# Patient Record
Sex: MALE | Race: WHITE
[De-identification: names, ages, dates, MRNs, and addresses within clinical notes are randomized per-mention and may not be internally consistent; named-entity substitution may affect disease eponyms.]

---

## 2018-12-04 ENCOUNTER — HOSPITAL ENCOUNTER (EMERGENCY)
Dept: HOSPITAL 88 - ER | Age: 83
LOS: 1 days | Discharge: HOME | End: 2018-12-05
Payer: MEDICARE

## 2018-12-04 VITALS — HEIGHT: 78 IN | BODY MASS INDEX: 22.79 KG/M2 | WEIGHT: 197 LBS

## 2018-12-04 DIAGNOSIS — I10: Primary | ICD-10-CM

## 2018-12-04 DIAGNOSIS — Z79.4: ICD-10-CM

## 2018-12-04 DIAGNOSIS — E11.9: ICD-10-CM

## 2018-12-04 PROCEDURE — 93005 ELECTROCARDIOGRAM TRACING: CPT

## 2018-12-04 PROCEDURE — 36415 COLL VENOUS BLD VENIPUNCTURE: CPT

## 2018-12-04 PROCEDURE — 85025 COMPLETE CBC W/AUTO DIFF WBC: CPT

## 2018-12-04 PROCEDURE — 82550 ASSAY OF CK (CPK): CPT

## 2018-12-04 PROCEDURE — 99283 EMERGENCY DEPT VISIT LOW MDM: CPT

## 2018-12-04 PROCEDURE — 82553 CREATINE MB FRACTION: CPT

## 2018-12-04 PROCEDURE — 84484 ASSAY OF TROPONIN QUANT: CPT

## 2018-12-04 PROCEDURE — 80053 COMPREHEN METABOLIC PANEL: CPT

## 2018-12-04 NOTE — XMS REPORT
Clinical Summary

                             Created on: 2018



Archie Nick

External Reference #: [id number]

: 1935

Sex: Male



Demographics







                          [place]                   [facility] [place]  [address]

 

                          Home Phone                [phone]

 

                          Preferred Language        English

 

                          Marital Status            Unknown

 

                          Baptism Affiliation     Taoism

 

                          Race                      White

 

                          Ethnic Group              Non-





Author







                          Author                    [facility]

 

                          Address                   Unknown

 

                          Phone                     Unavailable







Support







                Name            Relationship    Address         Phone

 

                Oskar Nick     EC[employer]            Unknown         [phone]







Care Team Providers







                    Care Team Member Name    Role                Phone

 

                    Sharpless           PCP                 [phone]







Allergies

No Known Allergies



Medications







                          End Date                  Status



              Medication     Sig          Dispensed     Refills      Start  



                                         Date  

 

                                                    Active



                     cloNIDine HCl (CATAPRES)     Take 0.1 mg         0   



                           0.1 MG tablet             by mouth 2     



                                         (two) times     



                                         daily.     

 

                                                    Active



                     SITagliptin (JANUVIA) 25     Take 25 mg by       0   



                           MG tablet                 mouth daily.     

 

                                                    Active



                     amLODIPine (NORVASC) 10     Take 10 mg by       0   



                           MG tablet                 mouth daily.     

 

                                                    Active



                     tamsulosin (FLOMAX) 0.4     Take 0.4 mg         0   



                           mg Cp24 24 hr capsule     by mouth     



                                         daily.     

 

                                                    Active



                     losartan-hydroCHLOROthiaz     Take 1 tablet       0   



                           sandra (HYZAAR) 50-12.5 mg     by mouth     



                           per tablet                daily.     

 

                                                    Active



                     insulin 70/30, insulin     Inject 10           0   



                           NPH-insulin regular,      Units     



                           (NOVOLIN 70/30) 100       subcutaneousl     



                           unit/mL (70-30) injection     y 2 (two)     



                                         times daily     



                                         before meals.     

 

                                                    Active



              tamsulosin (FLOMAX) 0.4     Take 1       30 capsule     0            08/201  



                     mg Cp24 24 hr capsule     capsule (0.4        8  



                                         mg total) by     



                                         mouth daily.     

 

                          2019                Active



              finasteride (PROSCAR) 5     Take 1 tablet     30 tablet     0            /201  



                     mg tablet           (5 mg total)        8  



                                         by mouth     



                                         daily.     

 

                          2018                



              docusate sodium (COLACE)     Take 1       10 capsule     0            201  



                     100 MG capsule      capsule (100        8  



                                         mg total) by     



                                         mouth 2 (two)     



                                         times daily     



                                         for 10 days.     

 

                          2018                



              traMADol (ULTRAM) 50 mg     Take 2       30 tablet     0            201  



                     tablet              tablets (100        8  



                                         mg total) by     



                                         mouth every 6     



                                         (six) hours     



                                         as needed for     



                                         up to 10     



                                         days. Max     



                                         Daily Amount:     



                                         400 mg     







Active Problems







 



                           Problem                   Noted Date

 

 



                           Renal mass                2018







Encounters







                          Care Team                 Description



                     Date                Type                Specialty  

 

                                        



Peggy Silva MD                 



                           2018                Anesthesia   



                                         Event   

 

                                        



Link, Mariusz Mccray MD                ROBOTIC LAPAROSCOPY,NEPHRECTOMY



                           2018                Surgery   

 

                                        



Link, Mariusz Mccray MD                 



                     2018          [facility]            General Internal Medicine  



                           -                         Encounter   



                                         2018    

 

                                        



LinkMariusz MD                 



                     2018          Hospital            Pre-Admission Testing  



                                         Encounter   

 

                                        



Jordyn Rocha BHAVYA                         



                     2018          Orders Only         Pre-Admission Testing  



after 2017



Social History







                                        Date



                 Tobacco Use     Types           Packs/Day       Years Used 

 

                                        Quit: 



                     Former Smoker       1.5                 13 

 

    



                                         Smokeless Tobacco: Never   



                                         Used   









   



                 Alcohol Use     Drinks/Week     oz/Week         Comments

 

   



                                         No   









 



                           Sex Assigned at Birth     Date Recorded

 

 



                                         Not on file 









                                        Industry



                           Job Start Date            Occupation 

 

                                        Not on file



                           Not on file               Not on file 









                                        Travel End



                           Travel History            Travel Start 

 





                                         No recent travel history available.







Last Filed Vital Signs







                                        Time Taken



                           Vital Sign                Reading 

 

                                        2018  3:37 PM CDT



                           Blood Pressure            172/78 

 

                                        2018  3:37 PM CDT



                           Pulse                     91 

 

                                        2018  3:37 PM CDT



                           Temperature               36.7 C (98 F) 

 

                                        2018  3:37 PM CDT



                           Respiratory Rate          18 

 

                                        2018  3:37 PM CDT



                           Oxygen Saturation         92% 

 

                                        -



                           Inhaled Oxygen            - 



                                         Concentration  

 

                                        2018  6:14 AM CDT



                           Weight                    89.4 kg (197 lb 1.6 oz) 

 

                                        2018  6:14 AM CDT



                           Height                    175.3 cm (5' 9.02") 

 

                                        2018  6:14 AM CDT



                           Body Mass Index           29.09 







Plan of Treatment





Not on file



Procedures







                                        Comments



                 Procedure Name     Priority        Date/Time       Associated Diagnosis 

 

                                         



                           INTRAOPERATIVE PATH       2018  



                           REPORT - SCAN             3:30 PM CDT  

 

                                        



Results for this procedure are in the results section.



                     POCT-GLUCOSE METER     Routine             2018  



                                         2:19 PM CDT  

 

                                        



Results for this procedure are in the results section.



                     POCT-GLUCOSE METER     Routine             2018  



                                         9:54 AM CDT  

 

                                        



Results for this procedure are in the results section.



                     XR ABDOMEN 1 VIEW     STAT                2018  



                                         8:03 AM CDT  

 

                                        



Results for this procedure are in the results section.



                     CBC W/PLT COUNT & AUTO     Routine             2018  



                           DIFFERENTIAL              4:57 AM CDT  

 

                                        



Results for this procedure are in the results section.



                     CBC W/PLT COUNT & AUTO     Routine             2018  



                           DIFFERENTIAL              4:57 AM CDT  

 

                                        



Results for this procedure are in the results section.



                     BASIC METABOLIC PANEL (7)     Routine             2018  



                                         4:57 AM CDT  

 

                                        



Results for this procedure are in the results section.



                     POCT-GLUCOSE METER     Routine             2018  



                                         9:27 PM CDT  

 

                                        



Results for this procedure are in the results section.



                     POCT-GLUCOSE METER     Routine             2018  



                                         5:46 PM CDT  

 

                                        



Results for this procedure are in the results section.



                     POCT-GLUCOSE METER     Routine             2018  



                                         11:45 AM CDT  

 

                                        



Results for this procedure are in the results section.



                     POCT-GLUCOSE METER     Routine             2018  



                                         7:05 AM CDT  

 

                                        



Results for this procedure are in the results section.



                     XR CHEST 1 VIEW     STAT                2018  



                           PORTABLE/BEDSIDE          5:27 AM CDT  

 

                                        



Results for this procedure are in the results section.



                     CBC W/PLT COUNT & AUTO     Routine             2018  



                           DIFFERENTIAL              4:35 AM CDT  

 

                                        



Results for this procedure are in the results section.



                     CBC W/PLT COUNT & AUTO     Routine             2018  



                           DIFFERENTIAL              4:35 AM CDT  

 

                                        



Results for this procedure are in the results section.



                     BASIC METABOLIC PANEL (7)     Routine             2018  



                                         4:35 AM CDT  

 

                                        



Results for this procedure are in the results section.



                     POCT-GLUCOSE METER     Routine             2018  



                                         9:01 PM CDT  

 

                                        



Results for this procedure are in the results section.



                     POCT-GLUCOSE METER     Routine             2018  



                                         3:25 PM CDT  

 

                                        



Results for this procedure are in the results section.



                     POCT-GLUCOSE METER     Routine             2018  



                                         11:45 AM CDT  

 

                                        



Results for this procedure are in the results section.



                     POCT-GLUCOSE METER     Routine             2018  



                                         7:36 AM CDT  

 

                                        



Results for this procedure are in the results section.



                     CBC W/PLT COUNT & AUTO     Routine             2018  



                           DIFFERENTIAL              3:07 AM CDT  

 

                                        



Results for this procedure are in the results section.



                     CBC W/PLT COUNT & AUTO     Routine             2018  



                           DIFFERENTIAL              3:07 AM CDT  

 

                                        



Results for this procedure are in the results section.



                     BASIC METABOLIC PANEL (7)     Routine             2018  



                                         3:07 AM CDT  

 

                                        



Results for this procedure are in the results section.



                     POCT-GLUCOSE METER     Routine             2018  



                                         9:08 PM CDT  

 

                                        



Results for this procedure are in the results section.



                     POCT-GLUCOSE METER     Routine             2018  



                                         5:20 PM CDT  

 

                                        



Results for this procedure are in the results section.



                     BASIC METABOLIC PANEL (7)     Routine             2018  



                                         3:27 PM CDT  

 

                                        



Results for this procedure are in the results section.



                     POCT-GLUCOSE METER     Routine             2018  



                                         11:52 AM CDT  

 

                                        



Results for this procedure are in the results section.



                     POCT-GLUCOSE METER     Routine             2018  



                                         8:10 AM CDT  

 

                                        



Results for this procedure are in the results section.



                     CBC W/PLT COUNT & AUTO     Routine             2018  



                           DIFFERENTIAL              5:04 AM CDT  

 

                                        



Results for this procedure are in the results section.



                     CBC W/PLT COUNT & AUTO     Routine             2018  



                           DIFFERENTIAL              5:04 AM CDT  

 

                                        



Results for this procedure are in the results section.



                     BASIC METABOLIC PANEL (7)     Routine             2018  



                                         5:04 AM CDT  

 

                                        



Results for this procedure are in the results section.



                     POCT-GLUCOSE METER     Routine             2018  



                                         8:54 PM CDT  

 

                                        



Results for this procedure are in the results section.



                     POCT-GLUCOSE METER     Routine             2018  



                                         5:04 PM CDT  

 

                                        



Results for this procedure are in the results section.



                     HEMOGLOBIN AND HEMATOCRIT     Routine             2018  



                                         12:03 PM CDT  

 

                                        



Results for this procedure are in the results section.



                     BASIC METABOLIC PANEL (7)     Routine             2018  



                                         12:03 PM CDT  

 

                                        



Results for this procedure are in the results section.



                     POCT-GLUCOSE METER     Routine             2018  



                                         12:01 PM CDT  

 

                                        



Results for this procedure are in the results section.



                     GLUCOSE-STAT LAB     STAT                2018  



                                         10:53 AM CDT  

 

                                        



Results for this procedure are in the results section.



                     TISSUE EXAM         AP Routine          2018  



                                         10:37 AM CDT  

 

                                        



Results for this procedure are in the results section.



                     GLUCOSE-STAT LAB     STAT                2018  



                                         9:36 AM CDT  

 

                                        



Results for this procedure are in the results section.



                     BLOOD GAS, ARTERIAL     STAT                2018  



                                         8:26 AM CDT  

 

                                        



Results for this procedure are in the results section.



                     CALCIUM, IONIZED     STAT                2018  



                                         8:26 AM CDT  

 

                                        



Results for this procedure are in the results section.



                     HGB/HCT (H&H) - STAT LAB     STAT                2018  



                                         8:26 AM CDT  

 

                                        



Results for this procedure are in the results section.



                     GLUCOSE-STAT LAB     STAT                2018  



                                         8:26 AM CDT  

 

                                        



Results for this procedure are in the results section.



                     POTASSIUM-STAT LAB     STAT                2018  



                                         8:26 AM CDT  

 

                                        



Results for this procedure are in the results section.



                     SODIUM NA-STAT LAB     STAT                2018  



                                         8:26 AM CDT  

 

                                         



                     PROCEDURE W/ DAVINCI      2018          Right renal mass 



                                         7:30 AM CDT  

 

  



                                         Special



                                         Needs



                                         (DAVINCI)

 

                                         



                     ROBOTIC             2018          Right renal mass 



                           LAPAROSCOPY,NEPHRECTOMY      7:30 AM CDT  

 

  



                                         Special



                                         Needs



                                         (DAVINCI)

 

                                        



Results for this procedure are in the results section.



                     POCT-GLUCOSE METER     Routine             2018  



                                         6:23 AM CDT  

 

                                         



                           TRANSFUSION SERVICE       2018  



                           REPORT - SCAN             5:44 PM CDT  

 

                                        



Results for this procedure are in the results section.



                     CBC W/PLT COUNT & AUTO     Routine             2018  



                           DIFFERENTIAL              11:37 AM CDT  

 

                                        



Results for this procedure are in the results section.



                     TYPE AND SCREEN,     Routine             2018  



                           AUTOMATED                 11:37 AM CDT  

 

                                        



Results for this procedure are in the results section.



                     APTT                Routine             2018  



                                         11:37 AM CDT  

 

                                        



Results for this procedure are in the results section.



                     PROTHROMBIN TIME/INR     Routine             2018  



                                         11:37 AM CDT  

 

                                        



Results for this procedure are in the results section.



                     COMPREHENSIVE METABOLIC     Routine             2018  



                           PANEL                     11:37 AM CDT  

 

                                        



Results for this procedure are in the results section.



                     CBC W/PLT COUNT & AUTO     Routine             2018  



                           DIFFERENTIAL              11:37 AM CDT  

 

                                        



Results for this procedure are in the results section.



                     URINALYSIS W/ MICROSCOPIC     Routine             2018  



                                         11:37 AM CDT  

 

                                        



Results for this procedure are in the results section.



                     URINE CULTURE       Routine             2018  



                                         11:37 AM CDT  



after 2017



Results

* INTRAOPERATIVE PATH REPORT - SCAN (2018  3:30 PM CDT)





 



                           Narrative                 Performed At

 

 



                                         This result has an attachment that is not available. 





* POC-Glucose meter (2018  2:19 PM CDT)



Only the most recent of 18 results within the time period is included.





   



                 Component       Value           Ref Range       Performed At

 

   



                 POC-Glucose Meter     148 (H)Comment: TESTED AT     70 - 110 mg/dL     [facility] [address] [facility]



                                         [id number]  













                                         Specimen

 





                                         Blood









   



                 Performing Organization     Address         City/State/Zipcode     Phone Number

 

   



                 [facility]     [address], [place] [id number] [phone]





                                         [facility]   





* XR abdomen / KUB 1 view (2018  8:03 AM CDT)





 



                           Narrative                 Performed At

 

 



                           FINAL REPORT              [facility]



                                         PATIENT ID: [id number] 



                                         Abdomen 3 views supine 2018 8:20 AM 



                                         CLINICAL INDICATION: abdominal distension 



                                         COMPARISON: None available 



                                         IMPRESSION: 



                                         There is a substantial amount of stool in the proximal and transverse 



                                         colon, with scattered loops of air-filled distal small bowel. 



                                         Findings may reflect fecal impaction. 



                                         The lung bases are well aerated. No abnormal calcifications are seen 



                                         in the region of the gallbladder or kidneys. 



                                         There are no acute-appearing skeletal abnormalities. 



                                         Signed: Seipel, Timothy MD 



                                         Report Verified Date/Time:2018 08:21:07 



                                         Reading Location: [facility] Radiology Reading Room 



                                         Electronically signed by: TIMOTHY J SEIPEL, M.D. on 2018 08:21 AM

 









                                        Procedure Note

 

                                        



Interface, External Ris In - 2018  8:27 AM CDT



FINAL REPORT

 

PATIENT ID:   [id number]

 

Abdomen 3 views supine 2018 8:20 AM

 

CLINICAL INDICATION: abdominal distension

 

COMPARISON: None available

 

IMPRESSION:

 

There is a substantial amount of stool in the proximal and transverse 

colon, with scattered loops of air-filled distal small bowel. 

Findings may reflect fecal impaction.

 

The lung bases are well aerated. No abnormal calcifications are seen 

in the region of the gallbladder or kidneys.

 

There are no acute-appearing skeletal abnormalities.

 

Signed: Seipel, Timothy MD

Report Verified Date/Time:  2018 08:21:07

 

Reading Location: [facility] Radiology Reading Room

    Electronically signed by: TIMOTHY J SEIPEL, M.D. on 2018 08:21 AM

 









   



                 Performing Organization     Address         City/State/Zipcode     Phone Number

 

   



                                         [facility]   





* CBC with platelet count + automated diff (2018  4:57 AM CDT)



Only the most recent of 5 results within the time period is included.





   



                 Component       Value           Ref Range       Performed At

 

   



                 WBC             10.9 (H)        3.5 - 10.5 K/L     [facility]

 

   



                 RBC             4.43 (L)        4.63 - 6.08 M/L     [facility]

 

   



                 Hemoglobin      13.4 (L)        13.7 - 17.5 GM/DL     [facility]

 

   



                 Hematocrit      40.5            40.1 - 51.0 %     [facility]

 

   



                 MCV             91.4            79.0 - 92.2 fL     [facility]

 

   



                 MCH             30.2            25.7 - 32.2 pg     [facility]

 

   



                 MCHC            33.1            32.3 - 36.5 GM/DL     [facility]

 

   



                 RDW             12.9            11.6 - 14.4 %     [facility]

 

   



                 Platelets       231             150 - 450 K/CU MM     [facility]

 

   



                 MPV             11.5            9.4 - 12.4 fL     [facility]

 

   



                 nRBC            0               0 - 0 /100 WBC     [facility]

 

   



                 % Neutros       79              %               [facility]

 

   



                 % Lymphs        9               %               [facility]

 

   



                 % Monos         9               %               [facility]

 

   



                 % Eos           2               %               [facility]

 

   



                 % Baso          0               %               [facility]

 

   



                 # Neutros       8.57 (H)        1.78 - 5.38 K/L     [facility]

 

   



                 # Lymphs        0.99 (L)        1.32 - 3.57 K/L     [facility]

 

   



                 # Monos         0.97 (H)        0.30 - 0.82 K/L     [facility]

 

   



                 # Eos           0.19            0.04 - 0.54 K/L     [facility]

 

   



                 # Baso          0.02            0.01 - 0.08 K/L     [facility]

 

   



                 Immature        1               0 - 1 %         [facility]



                           Granulocytes-Relative       [facility]













                                         Specimen

 





                                         Blood - Arm, Right









   



                 Performing Organization     Address         City/State/Zipcode     Phone Number

 

   



                 [facility]     [address] [place] [address] [phone]





                                         MEDICAL CENTER   





* Basic Metabolic Panel (2018  4:57 AM CDT)



Only the most recent of 6 results within the time period is included.





   



                 Component       Value           Ref Range       Performed At

 

   



                 Sodium          130 (L)         136 - 145 meq/L     [facility]

 

   



                 Potassium       4.9             3.5 - 5.1 meq/L     [facility]

 

   



                 Chloride        96 (L)          98 - 107 meq/L     [facility]

 

   



                 CO2             22              22 - 29 meq/L     [facility]

 

   



                 BUN             32 (H)          7 - 21 mg/dL     [facility]

 

   



                 Creatinine      1.46 (H)        0.57 - 1.25 mg/dL     [facility]

 

   



                 Glucose         146 (H)         70 - 105 mg/dL     [facility]

 

   



                 Calcium         8.6             8.4 - 10.2 mg/dL     [facility]

 

   



                 EGFR            46Comment: ESTIMATED GFR IS     mL/min/1.73 sq m     [facility]



                           NOT AS ACCURATE AS CREATININE      [facility]



                                         CLEARANCE IN PREDICTING  



                                         GLOMERULAR FILTRATION RATE.  



                                         ESTIMATED GFR IS NOT  



                                         APPLICABLE FOR DIALYSIS  



                                         PATIENTS.  













                                         Specimen

 





                                         Blood - Arm, Right









   



                 Performing Organization     Address         City/State/Zipcode     Phone Number

 

   



                 [facility]     [address] [place] [address]     [phone] [place] [address]   





* XR chest 1 view portable / bedside (2018  5:27 AM CDT)





 



                           Narrative                 Performed At

 

 



                           FINAL REPORT               [facility]



                                         PATIENT ID: [id number] 



                                         RAD, CHEST, 1 VIEW, NON DEPT 



                                         INDICATION: desaturation 



                                         COMPARISON: None 



                                         FINDINGS: Portable frontal view of the chest. 



                                         IMPRESSION: 



                                         Support Lines: None. 



                                         Lungs and pleura: Subsegmental atelectasis bilaterally. Developing 



                                         consolidation noted in the left base may reflect pneumonia. No 



                                         pneumothorax. 



                                         Heart and mediastinum: Unremarkable mediastinal contours. 



                                         Additional findings: None. 



                                         Signed: JR Todd, Kaitlin RAMIREZ 



                                         Report Verified Date/Time:2018 05:40:11 



                                         Reading Location: [facility] C013Y CT Body Reading Room 



                                         Electronically signed by: KAITLIN BROOKS on 2018 05:40 AM

 









                                        Procedure Note

 

                                        



Interface, External Ris In - 2018  5:42 AM CDT



FINAL REPORT

 

PATIENT ID:   [id number]

 

RAD, CHEST, 1 VIEW, NON DEPT

 

INDICATION: desaturation

 

COMPARISON: None

 

FINDINGS: Portable frontal view of the chest. 

 

 

IMPRESSION: 

 

Support Lines: None. 

Lungs and pleura: Subsegmental atelectasis bilaterally. Developing 

consolidation noted in the left base may reflect pneumonia. No 

pneumothorax.

Heart and mediastinum: Unremarkable mediastinal contours.

Additional findings: None.

 

Signed: JR Brooks Robert MD

Report Verified Date/Time:  2018 05:40:11

 

Reading Location: [facility] B1 C013Y CT Body Reading Room

    Electronically signed by: KAITLIN BROOKS on 2018 05:40 AM

 









   



                 Performing Organization     Address         City/[facility]     Phone Number

 

   



                                          [facility]   





* Hemoglobin and hematocrit (2018 12:03 PM CDT)





   



                 Component       Value           Ref Range       Performed At

 

   



                 Hemoglobin      13.2 (L)        13.7 - 17.5 GM/DL     [facility]

 

   



                 Hematocrit      40.9            40.1 - 51.0 %     [facility]













                                         Specimen

 





                                         Blood









   



                 Performing Organization     Address         City/[facility]     Phone Number

 

   



                 [place] [phone] [place] [address]   





* Glucose-Stat Lab (2018 10:53 AM CDT)



Only the most recent of 3 results within the time period is included.





   



                 Component       Value           Ref Range       Performed At

 

   



                 Glucose         177 (H)         70 - 110 mg/dL     [facility]













                                         Specimen

 





                                         Blood, Arterial









   



                 Performing Organization     Address         City/State[facility]     Phone Number

 

   



                 [address] [address]     [phone]





                                         MEDICAL CENTER   





* Tissue Exam (2018 10:37 AM CDT)





   



                 Component       Value           Ref Range       Performed At

 

   



                     Case Report         Surgical Pathology      [facility]



                                          Case:  



                                         [id number]  



                                           



                                           



                                         Authorizing Provider:Mariusz Vasquez MD  



                                         Collected:  



                                         2018  



                                         1037  



                                         Ordering Location:  



                                         [facility]  



                                         PERIOPERATIVE  



                                         Received:  



                                         2018  



                                         1050  



                                           



                                           



                                         SERVICES  



                                           



                                           



                                           



                                           



                                         Pathologist:  



                                          Vinay Arguelles MD  



                                           



                                           



                                           



                                         Specimen:Mass, Right  



                                         Kidney Mass; Show and  



                                         Tell  



                                           



                                           



                                           

 

   



                     DIAGNOSIS           KIDNEY, RIGHT,      [facility]



                           ROBOTIC-ASSISTED          [facility]



                                         ADRENAL-SPARING LAPAROSCOPIC  



                                         RADICAL NEPHRECTOMY:  



                                         - CLEAR CELL RENAL CELL  



                                         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  



                                         GRADE 2-3 INVOLVING MIDPOLE  



                                         - UNIFOCAL, TUMOR SIZE: 4.8  



                                         x 3.9 x 2.6 CM  



                                         - TUMOR EXTENDS INTO RENAL  



                                         SINUS FAT  



                                         - NO DEFINITE LYMPHOVASCULAR  



                                         INVASION IS SEEN  



                                         - NO SARCOMATOID OR RHABDOID  



                                         FEATURES IDENTIFIED  



                                         - NECROSIS NOT IDENTIFIED  



                                         - RESECTION MARGINS  



                                         UNINVOLVED  



                                         - PATHOLOGIC STAGE  



                                         CLASSIFICATION (pTNM):  



                                         [id number]  



                                         - SEE SYNOPTIC REPORT  



                                         Signing Pathologist  



                                         Direct Phone Line:  



                                         [phone]  

 

   



                     COMMENT             IDC: Dr. Friedman agrees with      [facility]



                           perirenal sinus fat invasion.      [facility]

 

   



                     SYNOPTIC REPORT     KIDNEY: Nephrectomy(Kidney      [facility]



                           Res - All Specimens)      [facility]



                                         SPECIMEN  



                                           



                                         Procedure:Adrenal-spar  



                                         ing radical nephrectomy  



                                          Specimen  



                                         Laterality:Right  



                                         TUMOR  



                                          Tumor Site:Middle

  



                                          Histologic  



                                         Type:Clear cell renal  



                                         cell carcinoma  



                                          Histologic Grade (WHO /  



                                         ISUP Grade):G3:  



                                         Nucleoli conspicuous and  



                                         eosinophilic at 100x  



                                         magnification  



                                          Tumor  



                                         Size:Greatest  



                                         dimension in Centimeters (cm):  



                                         4.8 Centimeters (cm)  



                                          Additional Dimension  



                                         in Centimeters  



                                         (cm):3.9 Centimeters  



                                         (cm)  



                                          Additional Dimension  



                                         in Centimeters  



                                         (cm):2.6 Centimeters  



                                         (cm)  



                                          Tumor  



                                         Focality:Unifocal  



                                          Tumor  



                                         Extension:Tumor  



                                         extension into renal sinus  



                                          Sarcomatoid  



                                         Features:Not  



                                         identified  



                                          Rhabdoid  



                                         Features:Not  



                                         identified  



                                          Tumor  



                                         Necrosis:Not  



                                         identified  



                                          Lymphovascular  



                                         Invasion:Not  



                                         identified  



                                         MARGINS  



                                           



                                         Margins:Uninvolved by  



                                         invasive carcinoma  



                                         LYMPH NODES  



                                          Regional Lymph  



                                         Nodes:No lymph nodes  



                                         submitted or found  



                                         PATHOLOGIC STAGE  



                                         CLASSIFICATION (pTNM, AJCC 8th  



                                         Edition)  



                                          Primary Tumor  



                                         (pT):pT3a  



                                          Regional Lymph Nodes  



                                         (pN):pNX  



                                         ADDITIONAL FINDINGS  



                                          Pathologic Findings in  



                                         Nonneoplastic  



                                         Kidney:Glomerular  



                                         disease: focal global  



                                         glomerulosclerosis  



                                          Pathologic Findings in  



                                         Nonneoplastic  



                                         Kidney:Tubulointerstit  



                                         ial disease: chronic  



                                         tubulointerstitial  



                                         inflammation and atrophy  



                                          Pathologic Findings in  



                                         Nonneoplastic  



                                         Kidney:Vascular  



                                         disease: mild to moderate  



                                         arteriosclerosis  

 

   



                     CPT Code(s)         72826               [facility]

 

   



                     SPECIMEN SOURCE     Right kidney, description      [facility]



                           right kidney mass         [facility]

 

   



                     GROSS DESCRIPTION     The specimen is received in      [facility]



                           one part. Received fresh for      [facility]



                                         show and tell labeled with the  



                                         patient's information and  



                                         "mass" is a 27.5 x 15 x 9 cm  



                                         radical nephrectomy with an 8  



                                         cm in length ureter. The  



                                         kidney itself measures 10 x 6  



                                         x 5 cm. There is abundant  



                                         adipose tissue attached  



                                         measuring 12 x 11.5 x 5 cm.  



                                         The kidney is bivalved to show  



                                         a 4.8 x 3.9 x 2.6 cm,  



                                         encapsulated, lobulated,  



                                         case-yellow mass with focal  



                                         hemorrhage located at the mid  



                                         pole and pushing the ureter.  



                                         The tumor grossly seems to be  



                                         invading the renal sinus  



                                         tract. It does not invade the  



                                         ureter. Grossly the tumor does  



                                         not invade the renal capsule  



                                         or the perinephric fat. No  



                                         adrenal gland is identified.

  



                                         Section code: A1, vascular  



                                         and ureteral margins; A2-A4,  



                                         tumor involving renal sinus;  



                                         A5, tumor with ureter; A6, A7,  



                                         representative section of the  



                                         tumor; A8, A9, contiguous  



                                         section of tumor to the renal  



                                         cortex and capsule; A10, A11,  



                                         contiguous section, tumor  



                                         towards renal cortex and  



                                         capsule; A12, tumor with  



                                         adjacent parenchyma; A13, A14,  



                                         representative section of the  



                                         perinephric fat with Gerota's  



                                         fascia inked black. AG/ew  

 

   



                     MICROSCOPIC DESCRIPTION     Performed.          [facility]













                                         Specimen

 





                                         Tissue - Mass









   



                 Performing Organization     Address         City/[facility]     Phone Number

 

   



                 [address]   





* Potassium-Stat Lab (2018  8:26 AM CDT)





   



                 Component       Value           Ref Range       Performed At

 

   



                 Potassium       3.5 (L)         3.6 - 5.5 meq/L     [facility]













                                         Specimen

 





                                         Blood, Arterial









   



                 Performing Organization     Address         City/[facility]/Zipcode     Phone Number

 

   



                 [address]   





* Sodium Na-Stat Lab (2018  8:26 AM CDT)





   



                 Component       Value           Ref Range       Performed At

 

   



                 Sodium          137             135 - 148 meq/L     [facility]













                                         Specimen

 





                                         Blood, Arterial









   



                 Performing Organization     Address         City/[facility]     Phone Number

 

   



                 [address]   





* HGB/HCT (H&H)-Stat Lab (2018  8:26 AM CDT)





   



                 Component       Value           Ref Range       Performed At

 

   



                 Hemoglobin      13.0            13.0 - 16.8 g/dL     [facility]

 

   



                 Hematocrit      38.0 (L)        40.0 - 50.0 %     [facility]













                                         Specimen

 





                                         Blood, Arterial









   



                 Performing Organization     Address         City/[facility]     Phone Number

 

   



                 [address] [address]     [phone]   





* Calcium, Ionized (2018  8:26 AM CDT)





   



                 Component       Value           Ref Range       Performed At

 

   



                 Calcium, Ion     1.03 (L)        1.12 - 1.27 mmol/L     [facility]

 

   



                     pH, Blood           7.38                [facility]













                                         Specimen

 





                                         Blood









   



                 Performing Organization     Address         City/[facility]     Phone Number

 

   



                 [phone] [place] [address]   





* Blood gas, arterial (2018  8:26 AM CDT)





   



                 Component       Value           Ref Range       Performed At

 

   



                 pH, Arterial     7.41            7.35 - 7.45     [facility]

 

   



                 pCO2, Arterial     38              35 - 45 mmHg     [facility]

 

   



                 pO2, Arterial     424 (H)         80 - 90 mmHg     [facility]

 

   



                 O2 Sat, Arterial     99.8 (H)        96.0 - 97.0 %     [facility]

 

   



                 HCO3, Arterial     24              21 - 29 mmol/L     [facility]

 

   



                 Base Excess, Arterial     -1.1            -2.0 - 3.0 mmol/L     [facility]

 

   



                 Patient Temperature     35.3            C               [facility]

 

   



                 FIO2            95.0            %               [facility]













                                         Specimen

 





                                         Blood, Arterial









   



                 Performing Organization     Address         City/[facility]     Phone Number

 

   



                 [address] [phone]   





* TRANSFUSION SERVICE REPORT - SCAN (2018  5:44 PM CDT)





 



                           Narrative                 Performed At

 

 



                                         This result has an attachment that is not available. 





* Type and screen, automated (2018 11:37 AM CDT)





   



                 Component       Value           Ref Range       Performed At

 

   



                     ABO/RH AUTOMATED (BEAKER)     O NEGATIVE          [facility]

 

   



                     Ab Scrn             NEGATIVE            [facility]













                                         Specimen

 





                                         Blood









   



                 Performing Organization     Address         City/State/Zipcode     Phone Number

 

   



                 [facility]     [address] [place] [address] [phone]



                                         [facility] [place]   





* Urinalysis w/ Microscopic (2018 11:37 AM CDT)





   



                 Component       Value           Ref Range       Performed At

 

   



                     Color, UA           Light Yellow        [facility]

 

   



                     Clarity, UA         Clear               [facility]

 

   



                 Specific [place], UA     1.009           1.001 - 1.035     [facility]

 

   



                 pH, UA          6.0             5.0 - 8.0       [facility]

 

   



                 Protein, UA     Negative        Negative        [facility]

 

   



                 Glucose, UA     300 mg/dL (A)     Negative        [facility]

 

   



                 Ketones, UA     Negative        Negative        [facility]

 

   



                 Bilirubin, UA     Negative        Negative        [facility]

 

   



                 Blood, UA       Negative        Negative        [facility]

 

   



                 Nitrite, UA     Negative        Negative        [facility]

 

   



                 Leukocytes, UA     Negative        Negative        [facility]

 

   



                 Urobilinogen, UA     0.2             0.2 - 1.0 mg/dL     [facility]

 

   



                 RBC, UA         <1              /HPF            [facility]

 

   



                 WBC, UA         0               /HPF            [facility]

 

   



                 Squam Epithel, UA     1               /HPF            [facility]

 

   



                 Hyaline Casts, UA     4               /LPF            [facility]

 

   



                           Specimen Source           [facility]













                                         Specimen

 





                                         Urine









   



                 Performing Organization     Address         City/State/Zipcode     Phone Number

 

   



                 [facility]     [address] [place] [address] [phone]





                                         MEDICAL CENTER   





* aPTT (2018 11:37 AM CDT)





   



                 Component       Value           Ref Range       Performed At

 

   



                 PTT             27.3            22.5 - 36.0 seconds     [facility]













                                         Specimen

 





                                         Blood









   



                 Performing Organization     Address         City/[facility]/Zipcode     Phone Number

 

   



                 [address]   





* Prothrombin time/INR (2018 11:37 AM CDT)





   



                 Component       Value           Ref Range       Performed At

 

   



                 Protime         14.1            11.7 - 14.7 seconds     [facility]

 

   



                 INR             1.1             <=5.9           [facility]













                                         Specimen

 





                                         Blood









 



                           Narrative                 Performed At

 

 



                           RECOMMENDED COUMADIN/WARFARIN INR THERAPY RANGES     [facility]



                           STANDARD DOSE: 2.0 - 3.0 Includes: PROPHYLAXIS for venous thrombosis,     [facility]



                                         systemic embolization; TREATMENT for venous thrombosis and/or pulmonary embolus.

 



                                         HIGH RISK: Target INR is 2.5-3.5 for patients with mechanical heart valves. 









   



                 Performing Organization     Address         City/[facility]     Phone Number

 

   



                 [address]   





* Urine culture (2018 11:37 AM CDT)





   



                 Component       Value           Ref Range       Performed At

 

   



                     Result              50-59,000 col/mL skin bianca      [facility]













                                         Specimen

 





                                         Urine - Urine,



                                         Unspecified Source









   



                 Performing Organization     Address         City/[facility]/Zipcode     Phone Number

 

   



                 [address]   





* Comprehensive metabolic panel (2018 11:37 AM CDT)





   



                 Component       Value           Ref Range       Performed At

 

   



                 Protein, Total     7.5Comment: Specimen slightly     6.0 - 8.3 gm/dL     [facility]

 

   



                 Albumin         4.4Comment: Specimen slightly     3.5 - 5.0 g/dL     [facility]

 

   



                 Alkaline Phosphatase     115             40 - 150 U/L     [facility]

 

   



                 Total Bilirubin     0.5Comment: Specimen slightly     0.2 - 1.2 mg/dL     [facility]

 

   



                 Sodium          137             136 - 145 meq/L     [facility]

 

   



                 Potassium       5.0Comment: Specimen slightly     3.5 - 5.1 meq/L     [facility]





                           [facility]

 

   



                 Chloride        101             98 - 107 meq/L     [facility]

 

   



                 CO2             26              22 - 29 meq/L     [facility]

 

   



                 BUN             20              7 - 21 mg/dL     [facility]

 

   



                 Creatinine      1.51 (H)Comment: Specimen     0.57 - 1.25 mg/dL     [facility]





                           slightly hemolyzed        [facility]

 

   



                 Glucose         214 (H)         70 - 105 mg/dL     [facility]

 

   



                 Calcium         9.6             8.4 - 10.2 mg/dL     [facility]

 

   



                 AST             23Comment: Specimen slightly     5 - 34 U/L      [facility]



                           [facility]

 

   



                 ALT             23Comment: Specimen slightly     6 - 55 U/L      [facility]

 

   



                 EGFR            44Comment: ESTIMATED GFR IS     mL/min/1.73 sq m     [facility]



                           NOT AS ACCURATE AS CREATININE      [facility]



                                         CLEARANCE IN PREDICTING  



                                         GLOMERULAR FILTRATION RATE.  



                                         ESTIMATED GFR IS NOT  



                                         APPLICABLE FOR DIALYSIS  



                                         PATIENTS.  













                                         Specimen

 





                                         Blood









   



                 Performing Organization     Address         City/State/Zipcode     Phone Number

 

   



                 [address] [address] [phone]





                                         MEDICAL CENTER   





after 2017



Insurance







     



            Payer      Benefit     Subscriber ID     Type       Phone      Address



                                         Plan /    



                                         Group    

 

     



                 [employer] - MEDICARE [id number]     [employer]          xxxxxxxxx       [facility]                MEDICARE            Contracted  



                                         ADV    









     



            Guarantor Name     Account     Relation to     Date of     Phone      Billing Address



                     Type                Patient             Birth  

 

     



            Archie Nick     Personal/F     Self       1935     [phone]     [address]





                     jose               (Home)              [place] [address]







Advance Directives





For more information, please contact:



[address] [address] [phone]









                          Date Inactivated          Comments



                           Code Status               Date Activated  

 

                          2018  7:29 PM          



                           Full Code                 2018  2:45 PM  









  



                           This code status was determined by:     Patient

## 2018-12-04 NOTE — XMS REPORT
Patient Summary Document

                             Created on: 2018



MAN MARINELLI

External Reference #: [id number]

: 1935

Sex: Male



Demographics







                          Address                   [place]  [address]

 

                          Home Phone                [phone]

 

                          Preferred Language        Unknown

 

                          Marital Status            Unknown

 

                          Jew Affiliation     Unknown

 

                          Race                      Unknown

 

                                        Additional Race(s)  

 

                          Ethnic Group              Unknown





Author







                          Author                    [facility]

 

                          Address                   Unknown

 

                          Phone                     Unavailable







Care Team Providers







                    Care Team Member Name    Role                Phone

 

                    BANDAR VASQUEZ    Unavailable         Unavailable

 

                    LINDA ORELLANA       Unavailable         Unavailable







Problems

This patient has no known problems.



Allergies, Adverse Reactions, Alerts

This patient has no known allergies or adverse reactions.



Medications

This patient has no known medications.



Results







           Test Description    Test Time    Test Comments    Text Results    Atomic Results    Result

 Comments

 

                TISSUE EXAM     2018-05-10 16:32:00                    Surgical Pathology Report                  

       Case: [id number]                                 Authorizing Provider:  
Mariusz Vasquez MD   Collected:           2018 1037            Ord
ering Location:     [facility] PERIOPERATIVE         Received:            2018 
1050                                   SERVICES                                 
                                 Pathologist:           Vinay Arguelles MD
                                                  Specimen:    Mass, Right 
Kidney Mass; Show and Tell                                               KIDNEY,
RIGHT, ROBOTIC-ASSISTED ADRENAL-SPARING LAPAROSCOPIC RADICAL NEPHRECTOMY:  - 
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 
UNIFOCAL, TUMOR SIZE: 4.8 x 3.9 x 2.6 CM  - TUMOR EXTENDS INTO RENAL SINUS FAT  
- NO DEFINITE LYMPHOVASCULAR INVASION IS SEEN  - NO SARCOMATOID OR RHABDOID 
FEATURES IDENTIFIED  - NECROSIS NOT IDENTIFIED  - RESECTION MARGINS UNINVOLVED  
- PATHOLOGIC STAGE CLASSIFICATION (pTNM): [id number]  - SEE SYNOPTIC REPORT      
Signing Pathologist Direct Phone Line: [phone] signed by 
Vinay Arguelles MD on 5/10/2018 at  4:32 PMIDC: Dr. Friedman agrees with 
perirenal sinus fat invasion.KIDNEY: Nephrectomy  (Kidney Res - All 
Specimens)SPECIMEN   Procedure:    Adrenal-sparing radical nephrectomy    
Specimen Laterality:    Right TUMOR   Tumor Site:    Middle    Histologic Type: 
  Clear cell renal cell carcinoma    Histologic Grade (WHO / ISUP Grade):    G3:
Nucleoli conspicuous and eosinophilic at 100x magnification    Tumor Size:    
Greatest dimension in Centimeters (cm): 4.8 Centimeters (cm)     Additional 
Dimension in Centimeters (cm):    3.9 Centimeters (cm)     Additional Dimension 
in Centimeters (cm):    2.6 Centimeters (cm)   Tumor Focality:    Unifocal      
Tumor Extension:    Tumor extension into renal sinus      Sarcomatoid Features: 
  Not identified      Rhabdoid Features:    Not identified      Tumor Necrosis: 
  Not identified      Lymphovascular Invasion:    Not identified MARGINS   
Margins:    Uninvolved by invasive carcinoma LYMPH NODES   Regional Lymph Nodes:
   No lymph nodes submitted or found PATHOLOGIC STAGE CLASSIFICATION (pTNM, AJCC
8th Edition)   Primary Tumor (pT):    pT3a    Regional Lymph Nodes (pN):    pNX 
ADDITIONAL FINDINGS   Pathologic Findings in Nonneoplastic Kidney:    Glomerular
disease: focal global glomerulosclerosis    Pathologic Findings in Nonneoplastic
Kidney:    Tubulointerstitial disease: chronic tubulointerstitial inflammation 
and atrophy    Pathologic Findings in Nonneoplastic Kidney:    Vascular disease:
mild to moderate arteriosclerosis [id number] kidney, description right kidney 
mass The specimen is received in one part. Received fresh for show and tell 
labeled with the patient's information and "mass" is a 27.5 x 15 x 9 cm radical 
nephrectomy with an 8 cm in length ureter. The kidney itself measures 10 x 6 x 5
cm. There is abundant adipose tissue attached measuring 12 x 11.5 x 5 cm. The 
kidney is bivalved to show a 4.8 x 3.9 x 2.6 cm, encapsulated, lobulated, 
case-yellow mass with focal hemorrhage located at the mid pole and pushing the
ureter. The tumor grossly seems to be invading the renal sinus tract. It does 
not invade the ureter. Grossly the tumor does not invade the renal capsule or 
the perinephric fat. No adrenal gland is identified.Section code: A1, vascular 
and ureteral margins; A2-A4, tumor involving renal sinus; A5, tumor with ureter;
A6, A7, representative section of the tumor; A8, A9, contiguous section of tumor
to the renal cortex and capsule; A10, A11, contiguous section, tumor towards 
renal cortex and capsule; A12, tumor with adjacent parenchyma; A13, A14, 
representative section of the perinephric fat with Gerota's fascia inked black. 
AG/ewPerformed.                          

 

                POCT-GLUCOSE METER    2018 14:20:00                      

 

   

 

                POC-GLUCOSE METER (JAMEEL) (test code=1538)    148 mg/dL                 TESTED AT [facility] 

[address] [place] [address]





POCT-GLUCOSE [id number] 09:55:00* 





                Test Item       Value           Reference Range    Comments

 

                POC-GLUCOSE METER (BEAKER) (test code=1538)    173 mg/dL                 TESTED AT [facility] 

[address] [facility] [address]





RAD, ABDOMEN/KUB, 1 VIEW [id number] 08:21:00Reason for exam:->abdominal 
distensionFINAL REPORT PATIENT ID:   [id number] Abdomen 3 views supine 2018 
8:20 AM CLINICAL INDICATION: abdominal distension COMPARISON: None available 
IMPRESSION: There is a substantial amount of stool in the proximal and 
transverse colon, with scattered loops of air-filled distal small bowel. 
Findings may reflect fecal impaction. The lung bases are well aerated. No 
abnormal calcifications are seen in the region of the gallbladder or kidneys. 
There are no acute-appearing skeletal abnormalities. Signed: Seipel, Timothy 
MDReport Verified Date/Time:  2018 08:21:07 Reading Location: [facility] Radiology Reading Room    Electronically signed by: TIMOTHY J SEIPEL, M.D. 
on 2018 08:21 AM BASIC METABOLIC [id number] 05:57:00* 





                Test Item       Value           Reference Range    Comments

 

                SODIUM (BEAKER) (test code=381)    130 meq/L       136-145          

 

                POTASSIUM (BEAKER) (test code=379)    4.9 meq/L       3.5-5.1          

 

                CHLORIDE (BEAKER) (test code=382)    96 meq/L                   

 

                CO2 (BEAKER) (test code=355)    22 meq/L        22-29            

 

                BLOOD UREA NITROGEN (BEAKER) (test code=354)    32 mg/dL        7-21             

 

                CREATININE (BEAKER) (test code=358)    1.46 mg/dL      0.57-1.25        

 

                GLUCOSE RANDOM (BEAKER) (test code=652)    146 mg/dL                  

 

                CALCIUM (BEAKER) (test code=697)    8.6 mg/dL       8.4-10.2         

 

                EGFR (BEAKER) (test code=1092)    46 mL/min/1.73 sq m                    ESTIMATED GFR IS NOT AS 

ACCURATE AS CREATININE CLEARANCE IN PREDICTING GLOMERULAR FILTRATION RATE. 
ESTIMATED GFR IS NOT APPLICABLE FOR DIALYSIS PATIENTS.





CBC W/PLT COUNT & AUTO [id number] 05:55:00* 





                Test Item       Value           Reference Range    Comments

 

                WHITE BLOOD CELL COUNT (BEAKER) (test code=775)    10.9 K/ L       3.5-10.5         

 

                RED BLOOD CELL COUNT (BEAKER) (test code=761)    4.43 M/ L       4.63-6.08        

 

                HEMOGLOBIN (BEAKER) (test code=410)    13.4 GM/DL      13.7-17.5        

 

                HEMATOCRIT (BEAKER) (test code=411)    40.5 %          40.1-51.0        

 

                MEAN CORPUSCULAR VOLUME (BEAKER) (test code=753)    91.4 fL         79.0-92.2        

 

                MEAN CORPUSCULAR HEMOGLOBIN (BEAKER) (test code=751)    30.2 pg         25.7-32.2        

 

                    MEAN CORPUSCULAR HEMOGLOBIN CONC (BEAKER) (test code=752)    33.1 GM/DL          32.3-36.5

                                         

 

                RED CELL DISTRIBUTION WIDTH (BEAKER) (test code=412)    12.9 %          11.6-14.4        

 

                PLATELET COUNT (BEAKER) (test code=756)    231 K/CU MM     150-450          

 

                MEAN PLATELET VOLUME (BEAKER) (test code=754)    11.5 fL         9.4-12.4         

 

                NUCLEATED RED BLOOD CELLS (BEAKER) (test code=413)    0 /100 WBC      0-0              

 

                NEUTROPHILS RELATIVE PERCENT (BEAKER) (test code=429)    79 %                             

 

                LYMPHOCYTES RELATIVE PERCENT (BEAKER) (test code=430)    9 %                              

 

                MONOCYTES RELATIVE PERCENT (BEAKER) (test code=431)    9 %                              

 

                EOSINOPHILS RELATIVE PERCENT (BEAKER) (test code=432)    2 %                              

 

                BASOPHILS RELATIVE PERCENT (BEAKER) (test code=437)    0 %                              

 

                NEUTROPHILS ABSOLUTE COUNT (BEAKER) (test code=670)    8.57 K/ L       1.78-5.38        

 

                LYMPHOCYTES ABSOLUTE COUNT (BEAKER) (test code=414)    0.99 K/ L       1.32-3.57        

 

                MONOCYTES ABSOLUTE COUNT (BEAKER) (test code=415)    0.97 K/ L       0.30-0.82        

 

                EOSINOPHILS ABSOLUTE COUNT (BEAKER) (test code=416)    0.19 K/ L       0.04-0.54        

 

                BASOPHILS ABSOLUTE COUNT (BEAKER) (test code=417)    0.02 K/ L       0.01-0.08        

 

                IMMATURE GRANULOCYTES-RELATIVE PERCENT (BEAKER) (test code=2801)    1 %             0-1              





POCT-GLUCOSE [id number] 21:29:00* 





                Test Item       Value           Reference Range    Comments

 

                POC-GLUCOSE METER (BEAKER) (test code=1538)    156 mg/dL                 TESTED AT [place] [id number] [facility] [address]





POCT-GLUCOSE [id number] 17:48:00* 





                Test Item       Value           Reference Range    Comments

 

                POC-GLUCOSE METER (BEAKER) (test code=1538)    127 mg/dL                 TESTED AT [address] [address]





POCT-GLUCOSE [id number] 11:46:00* 





                Test Item       Value           Reference Range    Comments

 

                POC-GLUCOSE METER (BEAKER) (test code=1538)    198 mg/dL                 TESTED AT [address] [address]





POCT-GLUCOSE [id number] 08:24:00* 





                Test Item       Value           Reference Range    Comments

 

                POC-GLUCOSE METER (BEAKER) (test code=1538)    169 mg/dL                 TESTED AT [address] [address]





RAD, CHEST, 1 VIEW, NON [id number] 05:40:00Reason for exam:->
desaturationShould this be performed at the bedside?->YesFINAL REPORT PATIENT 
ID:   [id number] RAD, CHEST, 1 VIEW, NON DEPT INDICATION: desaturation COMPARISON:
None FINDINGS: Portable frontal view of the chest.   IMPRESSION:  Support Lines:
None. Lungs and pleura: Subsegmental atelectasis bilaterally. Developing 
consolidation noted in the left base may reflect pneumonia. No p
neumothorax.Heart and mediastinum: Unremarkable mediastinal contours.Additional 
findings: None. Signed: JR Brooks Robert MDReport Verified Date/Time:  0
2018 05:40:11 Reading Location: [facility] B1 C013Y CT Body Reading Room    Electr
onically signed by: KAITLIN BROOKS on 2018 05:40 AM BASIC 
METABOLIC [id number] 05:06:00* 





                Test Item       Value           Reference Range    Comments

 

                SODIUM (BEAKER) (test code=381)    131 meq/L       136-145          

 

                POTASSIUM (BEAKER) (test code=379)    4.8 meq/L       3.5-5.1          

 

                CHLORIDE (BEAKER) (test code=382)    98 meq/L                   

 

                CO2 (BEAKER) (test code=355)    24 meq/L        22-29            

 

                BLOOD UREA NITROGEN (BEAKER) (test code=354)    26 mg/dL        7-21             

 

                CREATININE (BEAKER) (test code=358)    1.47 mg/dL      0.57-1.25        

 

                GLUCOSE RANDOM (BEAKER) (test code=652)    155 mg/dL                  

 

                CALCIUM (BEAKER) (test code=697)    8.5 mg/dL       8.4-10.2         

 

                EGFR (BEAKER) (test code=1092)    46 mL/min/1.73 sq m                    ESTIMATED GFR IS NOT AS 

ACCURATE AS CREATININE CLEARANCE IN PREDICTING GLOMERULAR FILTRATION RATE. 
ESTIMATED GFR IS NOT APPLICABLE FOR DIALYSIS PATIENTS.





CBC W/PLT COUNT & AUTO [id number] 04:50:00* 





                Test Item       Value           Reference Range    Comments

 

                WHITE BLOOD CELL COUNT (BEAKER) (test code=775)    12.6 K/ L       3.5-10.5         

 

                RED BLOOD CELL COUNT (BEAKER) (test code=761)    4.42 M/ L       4.63-6.08        

 

                HEMOGLOBIN (BEAKER) (test code=410)    13.3 GM/DL      13.7-17.5        

 

                HEMATOCRIT (BEAKER) (test code=411)    41.5 %          40.1-51.0        

 

                MEAN CORPUSCULAR VOLUME (BEAKER) (test code=753)    93.9 fL         79.0-92.2        

 

                MEAN CORPUSCULAR HEMOGLOBIN (BEAKER) (test code=751)    30.1 pg         25.7-32.2        

 

                    MEAN CORPUSCULAR HEMOGLOBIN CONC (BEAKER) (test code=752)    32.0 GM/DL          32.3-36.5

                                         

 

                RED CELL DISTRIBUTION WIDTH (BEAKER) (test code=412)    12.9 %          11.6-14.4        

 

                PLATELET COUNT (BEAKER) (test code=756)    201 K/CU MM     150-450          

 

                MEAN PLATELET VOLUME (BEAKER) (test code=754)    10.9 fL         9.4-12.4         

 

                NUCLEATED RED BLOOD CELLS (BEAKER) (test code=413)    0 /100 WBC      0-0              

 

                NEUTROPHILS RELATIVE PERCENT (BEAKER) (test code=429)    80 %                             

 

                LYMPHOCYTES RELATIVE PERCENT (BEAKER) (test code=430)    7 %                              

 

                MONOCYTES RELATIVE PERCENT (BEAKER) (test code=431)    10 %                             

 

                EOSINOPHILS RELATIVE PERCENT (BEAKER) (test code=432)    2 %                              

 

                BASOPHILS RELATIVE PERCENT (BEAKER) (test code=437)    0 %                              

 

                NEUTROPHILS ABSOLUTE COUNT (BEAKER) (test code=670)    10.09 K/ L      1.78-5.38        

 

                LYMPHOCYTES ABSOLUTE COUNT (BEAKER) (test code=414)    0.92 K/ L       1.32-3.57        

 

                MONOCYTES ABSOLUTE COUNT (BEAKER) (test code=415)    1.24 K/ L       0.30-0.82        

 

                EOSINOPHILS ABSOLUTE COUNT (BEAKER) (test code=416)    0.21 K/ L       0.04-0.54        

 

                BASOPHILS ABSOLUTE COUNT (BEAKER) (test code=417)    0.02 K/ L       0.01-0.08        

 

                IMMATURE GRANULOCYTES-RELATIVE PERCENT (BEAKER) (test code=2801)    1 %             0-1              





POCT-GLUCOSE [id number] 21:15:00* 





                Test Item       Value           Reference Range    Comments

 

                POC-GLUCOSE METER (BEAKER) (test code=1538)    174 mg/dL                 TESTED AT [place] [id number]





POCT-GLUCOSE [id number] 15:50:00* 





                Test Item       Value           Reference Range    Comments

 

                POC-GLUCOSE METER (BEAKER) (test code=1538)    218 mg/dL                 TESTED AT [address] [address]





POCT-GLUCOSE [id number] 12:39:00* 





                Test Item       Value           Reference Range    Comments

 

                POC-GLUCOSE METER (BEAKER) (test code=1538)    184 mg/dL                 TESTED AT [address] [address]





POCT-GLUCOSE [id number] 08:22:00* 





                Test Item       Value           Reference Range    Comments

 

                POC-GLUCOSE METER (BEAKER) (test code=1538)    180 mg/dL                 TESTED AT [address] [address]





BASIC METABOLIC [id number] 05:35:00* 





                Test Item       Value           Reference Range    Comments

 

                SODIUM (BEAKER) (test code=381)    133 meq/L       136-145          

 

                POTASSIUM (BEAKER) (test code=379)    5.0 meq/L       3.5-5.1          

 

                CHLORIDE (BEAKER) (test code=382)    103 meq/L                  

 

                CO2 (BEAKER) (test code=355)    20 meq/L        22-29            

 

                BLOOD UREA NITROGEN (BEAKER) (test code=354)    29 mg/dL        7-21             

 

                CREATININE (BEAKER) (test code=358)    1.65 mg/dL      0.57-1.25        

 

                GLUCOSE RANDOM (BEAKER) (test code=652)    179 mg/dL                  

 

                CALCIUM (BEAKER) (test code=697)    8.2 mg/dL       8.4-10.2         

 

                EGFR (BEAKER) (test code=1092)    40 mL/min/1.73 sq m                    ESTIMATED GFR IS NOT AS 

ACCURATE AS CREATININE CLEARANCE IN PREDICTING GLOMERULAR FILTRATION RATE. 
ESTIMATED GFR IS NOT APPLICABLE FOR DIALYSIS PATIENTS.





CBC W/PLT COUNT & AUTO [id number] 05:33:00* 





                Test Item       Value           Reference Range    Comments

 

                WHITE BLOOD CELL COUNT (BEAKER) (test code=775)    13.9 K/ L       3.5-10.5         

 

                RED BLOOD CELL COUNT (BEAKER) (test code=761)    4.37 M/ L       4.63-6.08        

 

                HEMOGLOBIN (BEAKER) (test code=410)    13.3 GM/DL      13.7-17.5        

 

                HEMATOCRIT (BEAKER) (test code=411)    41.5 %          40.1-51.0        

 

                MEAN CORPUSCULAR VOLUME (BEAKER) (test code=753)    95.0 fL         79.0-92.2        

 

                MEAN CORPUSCULAR HEMOGLOBIN (BEAKER) (test code=751)    30.4 pg         25.7-32.2        

 

                    MEAN CORPUSCULAR HEMOGLOBIN CONC (BEAKER) (test code=752)    32.0 GM/DL          32.3-36.5

                                         

 

                RED CELL DISTRIBUTION WIDTH (BEAKER) (test code=412)    13.3 %          11.6-14.4        

 

                PLATELET COUNT (BEAKER) (test code=756)    220 K/CU MM     150-450          

 

                MEAN PLATELET VOLUME (BEAKER) (test code=754)    11.7 fL         9.4-12.4         

 

                NUCLEATED RED BLOOD CELLS (BEAKER) (test code=413)    0 /100 WBC      0-0              

 

                NEUTROPHILS RELATIVE PERCENT (BEAKER) (test code=429)    79 %                             

 

                LYMPHOCYTES RELATIVE PERCENT (BEAKER) (test code=430)    9 %                              

 

                MONOCYTES RELATIVE PERCENT (BEAKER) (test code=431)    10 %                             

 

                EOSINOPHILS RELATIVE PERCENT (BEAKER) (test code=432)    1 %                              

 

                BASOPHILS RELATIVE PERCENT (BEAKER) (test code=437)    0 %                              

 

                NEUTROPHILS ABSOLUTE COUNT (BEAKER) (test code=670)    10.98 K/ L      1.78-5.38        

 

                LYMPHOCYTES ABSOLUTE COUNT (BEAKER) (test code=414)    1.23 K/ L       1.32-3.57        

 

                MONOCYTES ABSOLUTE COUNT (BEAKER) (test code=415)    1.35 K/ L       0.30-0.82        

 

                EOSINOPHILS ABSOLUTE COUNT (BEAKER) (test code=416)    0.18 K/ L       0.04-0.54        

 

                BASOPHILS ABSOLUTE COUNT (BEAKER) (test code=417)    0.02 K/ L       0.01-0.08        

 

                IMMATURE GRANULOCYTES-RELATIVE PERCENT (BEAKER) (test code=2801)    1 %             0-1              





POCT-GLUCOSE [id number] 21:31:00* 





                Test Item       Value           Reference Range    Comments

 

                POC-GLUCOSE METER (BEAKER) (test code=1538)    209 mg/dL                 TESTED AT [address] [address]





POCT-GLUCOSE [id number] 17:22:00* 





                Test Item       Value           Reference Range    Comments

 

                POC-GLUCOSE METER (BEAKER) (test code=1538)    183 mg/dL                 TESTED AT [address] [address]





BASIC METABOLIC [id number] 15:56:00* 





                Test Item       Value           Reference Range    Comments

 

                SODIUM (BEAKER) (test code=381)    134 meq/L       136-145          

 

                POTASSIUM (BEAKER) (test code=379)    4.7 meq/L       3.5-5.1          

 

                CHLORIDE (BEAKER) (test code=382)    104 meq/L                  

 

                CO2 (BEAKER) (test code=355)    22 meq/L        22-29            

 

                BLOOD UREA NITROGEN (BEAKER) (test code=354)    29 mg/dL        7-21             

 

                CREATININE (BEAKER) (test code=358)    1.79 mg/dL      0.57-1.25        

 

                GLUCOSE RANDOM (BEAKER) (test code=652)    205 mg/dL                  

 

                CALCIUM (BEAKER) (test code=697)    8.2 mg/dL       8.4-10.2         

 

                EGFR (BEAKER) (test code=1092)    37 mL/min/1.73 sq m                    ESTIMATED GFR IS NOT AS 

ACCURATE AS CREATININE CLEARANCE IN PREDICTING GLOMERULAR FILTRATION RATE. 
ESTIMATED GFR IS NOT APPLICABLE FOR DIALYSIS PATIENTS.





POCT-GLUCOSE [id number] 11:55:00* 





                Test Item       Value           Reference Range    Comments

 

                POC-GLUCOSE METER (BEAKER) (test code=1538)    230 mg/dL                 TESTED AT [facility] 

[address] [facility] [address]





POCT-GLUCOSE [id number] 08:15:00* 





                Test Item       Value           Reference Range    Comments

 

                POC-GLUCOSE METER (BEAKER) (test code=1538)    265 mg/dL                 TESTED AT [facility] 

[address] [facility] [address]





BASIC METABOLIC [id number] 07:08:00* 





                Test Item       Value           Reference Range    Comments

 

                SODIUM (BEAKER) (test code=381)    133 meq/L       136-145          

 

                POTASSIUM (BEAKER) (test code=379)    5.8 meq/L       3.5-5.1          

 

                CHLORIDE (BEAKER) (test code=382)    107 meq/L                  

 

                CO2 (BEAKER) (test code=355)    13 meq/L        22-29            

 

                BLOOD UREA NITROGEN (BEAKER) (test code=354)    27 mg/dL        7-21             

 

                CREATININE (BEAKER) (test code=358)    1.80 mg/dL      0.57-1.25        

 

                GLUCOSE RANDOM (BEAKER) (test code=652)    255 mg/dL                  

 

                CALCIUM (BEAKER) (test code=697)    8.1 mg/dL       8.4-10.2         

 

                EGFR (BEAKER) (test code=1092)    36 mL/min/1.73 sq m                    ESTIMATED GFR IS NOT AS 

ACCURATE AS CREATININE CLEARANCE IN PREDICTING GLOMERULAR FILTRATION RATE. 
ESTIMATED GFR IS NOT APPLICABLE FOR DIALYSIS PATIENTS.





Before [facility]Saint [facility] & AUTO DIFFERENTIAL
2018 06:52:00* 





                Test Item       Value           Reference Range    Comments

 

                WHITE BLOOD CELL COUNT (BEAKER) (test code=775)    13.6 K/ L       3.5-10.5         

 

                RED BLOOD CELL COUNT (BEAKER) (test code=761)    4.55 M/ L       4.63-6.08        

 

                HEMOGLOBIN (BEAKER) (test code=410)    13.8 GM/DL      13.7-17.5        

 

                HEMATOCRIT (BEAKER) (test code=411)    43.4 %          40.1-51.0        

 

                MEAN CORPUSCULAR VOLUME (BEAKER) (test code=753)    95.4 fL         79.0-92.2        

 

                MEAN CORPUSCULAR HEMOGLOBIN (BEAKER) (test code=751)    30.3 pg         25.7-32.2        

 

                    MEAN CORPUSCULAR HEMOGLOBIN CONC (BEAKER) (test code=752)    31.8 GM/DL          32.3-36.5

                                         

 

                RED CELL DISTRIBUTION WIDTH (BEAKER) (test code=412)    13.2 %          11.6-14.4        

 

                PLATELET COUNT (BEAKER) (test code=756)    193 K/CU MM     150-450          

 

                MEAN PLATELET VOLUME (BEAKER) (test code=754)    11.1 fL         9.4-12.4         

 

                NUCLEATED RED BLOOD CELLS (BEAKER) (test code=413)    0 /100 WBC      0-0              

 

                NEUTROPHILS RELATIVE PERCENT (BEAKER) (test code=429)    84 %                             

 

                LYMPHOCYTES RELATIVE PERCENT (BEAKER) (test code=430)    7 %                              

 

                MONOCYTES RELATIVE PERCENT (BEAKER) (test code=431)    8 %                              

 

                EOSINOPHILS RELATIVE PERCENT (BEAKER) (test code=432)    0 %                              

 

                BASOPHILS RELATIVE PERCENT (BEAKER) (test code=437)    0 %                              

 

                NEUTROPHILS ABSOLUTE COUNT (BEAKER) (test code=670)    11.50 K/ L      1.78-5.38        

 

                LYMPHOCYTES ABSOLUTE COUNT (BEAKER) (test code=414)    0.88 K/ L       1.32-3.57        

 

                MONOCYTES ABSOLUTE COUNT (BEAKER) (test code=415)    1.11 K/ L       0.30-0.82        

 

                EOSINOPHILS ABSOLUTE COUNT (BEAKER) (test code=416)    0.01 K/ L       0.04-0.54        

 

                BASOPHILS ABSOLUTE COUNT (BEAKER) (test code=417)    0.01 K/ L       0.01-0.08        

 

                IMMATURE GRANULOCYTES-RELATIVE PERCENT (BEAKER) (test code=2801)    1 %             0-1              





POCT-GLUCOSE [id number] 21:06:00* 





                Test Item       Value           Reference Range    Comments

 

                POC-GLUCOSE METER (BEAKER) (test code=1538)    256 mg/dL                 TESTED AT [facility] 

[address] [facility] [address]





POCT-GLUCOSE [id number] 17:10:00* 





                Test Item       Value           Reference Range    Comments

 

                POC-GLUCOSE METER (BEAKER) (test code=1538)    201 mg/dL                 TESTED AT [address] [address]





HEMOGLOBIN AND [id number] 12:43:00* 





                Test Item       Value           Reference Range    Comments

 

                HEMOGLOBIN (BEAKER) (test code=410)    13.2 GM/DL      13.7-17.5        

 

                HEMATOCRIT (BEAKER) (test code=411)    40.9 %          40.1-51.0        





BASIC METABOLIC [id number] 12:31:00* 





                Test Item       Value           Reference Range    Comments

 

                SODIUM (BEAKER) (test code=381)    138 meq/L       136-145          

 

                POTASSIUM (BEAKER) (test code=379)    4.1 meq/L       3.5-5.1          

 

                CHLORIDE (BEAKER) (test code=382)    108 meq/L                  

 

                CO2 (BEAKER) (test code=355)    20 meq/L        22-29            

 

                BLOOD UREA NITROGEN (BEAKER) (test code=354)    28 mg/dL        7-21             

 

                CREATININE (BEAKER) (test code=358)    1.58 mg/dL      0.57-1.25        

 

                GLUCOSE RANDOM (BEAKER) (test code=652)    202 mg/dL                  

 

                CALCIUM (BEAKER) (test code=697)    8.2 mg/dL       8.4-10.2         

 

                EGFR (BEAKER) (test code=1092)    42 mL/min/1.73 sq m                    ESTIMATED GFR IS NOT AS 

ACCURATE AS CREATININE CLEARANCE IN PREDICTING GLOMERULAR FILTRATION RATE. 
ESTIMATED GFR IS NOT APPLICABLE FOR DIALYSIS PATIENTS.





Upon arrival to PACUPOCT-GLUCOSE [id number] 12:05:00* 





                Test Item       Value           Reference Range    Comments

 

                POC-GLUCOSE METER (BEAKER) (test code=1538)    204 mg/dL                 TESTED AT [address] [address]





GLUCOSE-STAT [id number] 11:00:00* 





                Test Item       Value           Reference Range    Comments

 

                GLUCOSE RANDOM (BEAKER) (test code=652)    177 mg/dL                  





GLUCOSE-STAT [id number] 09:55:00* 





                Test Item       Value           Reference Range    Comments

 

                GLUCOSE RANDOM (BEAKER) (test code=652)    114 mg/dL                  





HGB/HCT (H&H) - STAT [id number] 08:46:00* 





                Test Item       Value           Reference Range    Comments

 

                HEMOGLOBIN (BEAKER) (test code=410)    13.0 g/dL       13.0-16.8        

 

                HEMATOCRIT (BEAKER) (test code=411)    38.0 %          40.0-50.0        





CALCIUM, [id number] 08:46:00* 





                Test Item       Value           Reference Range    Comments

 

                CALCIUM IONIZED (BEAKER) (test code=698)    1.03 mmol/L     1.12-1.27        

 

                PH, BLOOD (BEAKER) (test code=1810)    7.38                             





BLOOD GAS, [id number] 08:43:00* 





                Test Item       Value           Reference Range    Comments

 

                PH ARTERIAL (BEAKER) (test code=383)    7.41            7.35-7.45        

 

                PCO2 ARTERIAL (BEAKER) (test code=384)    38 mmHg         35-45            

 

                PO2 ARTERIAL (BEAKER) (test code=385)    424 mmHg        80-90            

 

                O2 SATURATION ARTERIAL (BEAKER) (test code=386)    99.8 %          96.0-97.0        

 

                HCO3 ARTERIAL (BEAKER) (test code=388)    24 mmol/L       21-29            

 

                BASE EXCESS ARTERIAL (BEAKER) (test code=387)    -1.1 mmol/L     -2.0-3.0         

 

                PATIENT TEMPERATURE (BEAKER) (test code=1818)    35.3 C                           

 

                FIO2 (BEAKER) (test code=1819)    95.0 %                           





GLUCOSE-STAT [id number] 08:42:00* 





                Test Item       Value           Reference Range    Comments

 

                GLUCOSE RANDOM (BEAKER) (test code=652)    70 mg/dL                   





SODIUM NA-STAT [id number] 08:42:00* 





                Test Item       Value           Reference Range    Comments

 

                SODIUM (BEAKER) (test code=381)    137 meq/L       135-148          





POTASSIUM-STAT [id number] 08:42:00* 





                Test Item       Value           Reference Range    Comments

 

                POTASSIUM (BEAKER) (test code=379)    3.5 meq/L       3.6-5.5          





POCT-GLUCOSE [id number] 06:26:00* 





                Test Item       Value           Reference Range    Comments

 

                POC-GLUCOSE METER (BEAKER) (test code=1538)    79 mg/dL                  TESTED AT [facility] [address]

 [facility] [address]





URINE [id number] 15:56:00* 





                Test Item       Value           Reference Range    Comments

 

                CULTURE (BEAKER) (test code=1095)    50-59,000 col/mL skin bianca                     





URINALYSIS W/ [id number] 14:53:00* 





                Test Item       Value           Reference Range    Comments

 

                COLOR (BEAKER) (test code=470)    Light Yellow                     

 

                CLARITY (BEAKER) (test code=469)    Clear                            

 

                SPECIFIC GRAVITY UA (BEAKER) (test code=468)    1.009           1.001-1.035      

 

                PH UA (BEAKER) (test code=467)    6.0             5.0-8.0          

 

                PROTEIN UA (BEAKER) (test code=464)    Negative        Negative         

 

                GLUCOSE UA (BEAKER) (test code=365)    300 mg/dL       Negative         

 

                KETONES UA (BEAKER) (test code=371)    Negative        Negative         

 

                BILIRUBIN UA (BEAKER) (test code=462)    Negative        Negative         

 

                BLOOD UA (BEAKER) (test code=461)    Negative        Negative         

 

                NITRITE UA (BEAKER) (test code=465)    Negative        Negative         

 

                LEUKOCYTE ESTERASE UA (BEAKER) (test code=466)    Negative        Negative         

 

                UROBILINOGEN UA (BEAKER) (test code=463)    0.2 mg/dL       0.2-1.0          

 

                RBC UA (BEAKER) (test code=519)    < /HPF                           

 

                WBC UA (BEAKER) (test code=520)    0 /HPF                           

 

                SQUAMOUS EPITHELIAL (BEAKER) (test code=516)    1 /HPF                           

 

                HYALINE CASTS (BEAKER) (test code=514)    4 /LPF                           

 

                SOURCE(BEAKER) (test code=2795)                                     





COMPREHENSIVE METABOLIC [id number] 12:53:00* 





                Test Item       Value           Reference Range    Comments

 

                TOTAL PROTEIN (BEAKER) (test code=770)    7.5 gm/dL       6.0-8.3         Specimen slightly hemolyzed



 

                ALBUMIN (BEAKER) (test code=1145)    4.4 g/dL        3.5-5.0         Specimen slightly hemolyzed



 

                ALKALINE PHOSPHATASE (BEAKER) (test code=346)    115 U/L                    

 

                BILIRUBIN TOTAL (BEAKER) (test code=377)    0.5 mg/dL       0.2-1.2         Specimen slightly 

hemolyzed

 

                SODIUM (BEAKER) (test code=381)    137 meq/L       136-145          

 

                POTASSIUM (BEAKER) (test code=379)    5.0 meq/L       3.5-5.1         Specimen slightly hemolyzed



 

                CHLORIDE (BEAKER) (test code=382)    101 meq/L                  

 

                CO2 (BEAKER) (test code=355)    26 meq/L        22-29            

 

                BLOOD UREA NITROGEN (BEAKER) (test code=354)    20 mg/dL        7-21             

 

                CREATININE (BEAKER) (test code=358)    1.51 mg/dL      0.57-1.25       Specimen slightly hemolyzed



 

                GLUCOSE RANDOM (BEAKER) (test code=652)    214 mg/dL                  

 

                CALCIUM (BEAKER) (test code=697)    9.6 mg/dL       8.4-10.2         

 

                AST (SGOT) (BEAKER) (test code=353)    23 U/L          5-34            Specimen slightly hemolyzed

 

                ALT (SGPT) (BEAKER) (test code=347)    23 U/L          6-55            Specimen slightly hemolyzed

 

                EGFR (BEAKER) (test code=1092)    44 mL/min/1.73 sq m                    ESTIMATED GFR IS NOT AS 

ACCURATE AS CREATININE CLEARANCE IN PREDICTING GLOMERULAR FILTRATION RATE. 
ESTIMATED GFR IS NOT APPLICABLE FOR DIALYSIS PATIENTS.





PROTHROMBIN TIME/[id number] 12:29:00* 





                Test Item       Value           Reference Range    Comments

 

                PROTIME (BEAKER) (test code=759)    14.1 seconds    11.7-14.7        

 

                INR (BEAKER) (test code=370)    1.1             <=5.9            





RECOMMENDED COUMADIN/WARFARIN INR THERAPY RANGESSTANDARD DOSE: 2.0 - 3.0   Inclu
keith: PROPHYLAXIS for venous thrombosis, systemic embolization; TREATMENT for jeison
ous thrombosis and/or pulmonary embolus.HIGH RISK: Target INR is 2.5-3.5 for pat
ients with mechanical heart valves.[id number] 12:29:00* 





                Test Item       Value           Reference Range    Comments

 

                PARTIAL THROMBOPLASTIN TIME (BEAKER) (test code=760)    27.3 seconds    22.5-36.0        







CBC W/PLT COUNT & AUTO [id number] 12:19:00* 





                Test Item       Value           Reference Range    Comments

 

                WHITE BLOOD CELL COUNT (BEAKER) (test code=775)    6.5 K/ L        3.5-10.5         

 

                RED BLOOD CELL COUNT (BEAKER) (test code=761)    5.26 M/ L       4.63-6.08        

 

                HEMOGLOBIN (BEAKER) (test code=410)    15.8 GM/DL      13.7-17.5        

 

                HEMATOCRIT (BEAKER) (test code=411)    49.2 %          40.1-51.0        

 

                MEAN CORPUSCULAR VOLUME (BEAKER) (test code=753)    93.5 fL         79.0-92.2        

 

                MEAN CORPUSCULAR HEMOGLOBIN (BEAKER) (test code=751)    30.0 pg         25.7-32.2        

 

                    MEAN CORPUSCULAR HEMOGLOBIN CONC (BEAKER) (test code=752)    32.1 GM/DL          32.3-36.5

                                         

 

                RED CELL DISTRIBUTION WIDTH (BEAKER) (test code=412)    13.0 %          11.6-14.4        

 

                PLATELET COUNT (BEAKER) (test code=756)    262 K/CU MM     150-450          

 

                MEAN PLATELET VOLUME (BEAKER) (test code=754)    11.2 fL         9.4-12.4         

 

                NUCLEATED RED BLOOD CELLS (BEAKER) (test code=413)    0 /100 WBC      0-0              

 

                NEUTROPHILS RELATIVE PERCENT (BEAKER) (test code=429)    62 %                             

 

                LYMPHOCYTES RELATIVE PERCENT (BEAKER) (test code=430)    24 %                             

 

                MONOCYTES RELATIVE PERCENT (BEAKER) (test code=431)    12 %                             

 

                EOSINOPHILS RELATIVE PERCENT (BEAKER) (test code=432)    2 %                              

 

                BASOPHILS RELATIVE PERCENT (BEAKER) (test code=437)    0 %                              

 

                NEUTROPHILS ABSOLUTE COUNT (BEAKER) (test code=670)    3.99 K/ L       1.78-5.38        

 

                LYMPHOCYTES ABSOLUTE COUNT (BEAKER) (test code=414)    1.52 K/ L       1.32-3.57        

 

                MONOCYTES ABSOLUTE COUNT (BEAKER) (test code=415)    0.80 K/ L       0.30-0.82        

 

                EOSINOPHILS ABSOLUTE COUNT (BEAKER) (test code=416)    0.12 K/ L       0.04-0.54        

 

                BASOPHILS ABSOLUTE COUNT (BEAKER) (test code=417)    0.02 K/ L       0.01-0.08        

 

                IMMATURE GRANULOCYTES-RELATIVE PERCENT (BEAKER) (test code=2801)    1 %             0-1              





CHEST SINGLE (PORTABLE)    [facility]   [address] 
[place] [address]      Patient Name: MAN MARINELLI   MR
#: [id number]    : 1935 Age/Sex: 82/M  Acct #: [id number] Req #: [id number]
[id number]  Adm Physician:     Ordered by: RON ORELLANA MD  Report #: [id number]   
Location: ER  Room/Bed:     
______________________________________________________________________________
_____________________    Procedure: [id number] DX/CHEST SINGLE (PORTABLE)  Exam D
ate: 10/23/17                            Exam Time: 1430       REPORT STATUS: Si
gned    PROCEDURE:   CHEST SINGLE (PORTABLE)   TECHNIQUE:   Portable AP chest   
INDICATION:   Hypoglycemia   COMPARISON:   None.       FINDINGS:   Lungs are ben
ar and symmetrically inflated. No pleural effusions.    Normal heart size and me
diastinal contour. Mild aortic arch    calcification. Intact skeleton.          
CONCLUSION:   No acute abnormality.               Dictated by:  Mp florian M.D. on 10/23/2017 at 14:57        Electronically approved by:  Mp Fan M.D. on 10/23/2017 at    14:57                Dictated By: MP LAUGHLIN MD  Electronically Signed By: MP FAN MD on 10/23/17 1457  Transcribed
By: ARELIS on 10/23/17 1457       COPY TO:   RON ORELLANA MD

## 2018-12-05 VITALS — SYSTOLIC BLOOD PRESSURE: 159 MMHG | DIASTOLIC BLOOD PRESSURE: 76 MMHG

## 2018-12-05 LAB
ALBUMIN SERPL-MCNC: 4 G/DL (ref 3.5–5)
ALBUMIN/GLOB SERPL: 1.2 {RATIO} (ref 0.8–2)
ALP SERPL-CCNC: 132 IU/L (ref 40–150)
ALT SERPL-CCNC: 30 IU/L (ref 0–55)
ANION GAP SERPL CALC-SCNC: 15.9 MMOL/L (ref 8–16)
BASOPHILS # BLD AUTO: 0 10*3/UL (ref 0–0.1)
BASOPHILS NFR BLD AUTO: 0.5 % (ref 0–1)
BUN SERPL-MCNC: 32 MG/DL (ref 7–26)
BUN/CREAT SERPL: 17 (ref 6–25)
CALCIUM SERPL-MCNC: 9.8 MG/DL (ref 8.4–10.2)
CHLORIDE SERPL-SCNC: 102 MMOL/L (ref 98–107)
CK MB SERPL-MCNC: 5.1 NG/ML (ref 0–5)
CK SERPL-CCNC: 200 IU/L (ref 30–200)
CO2 SERPL-SCNC: 24 MMOL/L (ref 22–29)
DEPRECATED NEUTROPHILS # BLD AUTO: 5.5 10*3/UL (ref 2.1–6.9)
EGFRCR SERPLBLD CKD-EPI 2021: 34 ML/MIN (ref 60–?)
EOSINOPHIL # BLD AUTO: 0.2 10*3/UL (ref 0–0.4)
EOSINOPHIL NFR BLD AUTO: 2 % (ref 0–6)
ERYTHROCYTE [DISTWIDTH] IN CORD BLOOD: 12.7 % (ref 11.7–14.4)
GLOBULIN PLAS-MCNC: 3.3 G/DL (ref 2.3–3.5)
GLUCOSE SERPLBLD-MCNC: 263 MG/DL (ref 74–118)
HCT VFR BLD AUTO: 44.7 % (ref 38.2–49.6)
HGB BLD-MCNC: 14.9 G/DL (ref 14–18)
LYMPHOCYTES # BLD: 2.3 10*3/UL (ref 1–3.2)
LYMPHOCYTES NFR BLD AUTO: 26.1 % (ref 18–39.1)
MCH RBC QN AUTO: 31.6 PG (ref 28–32)
MCHC RBC AUTO-ENTMCNC: 33.3 G/DL (ref 31–35)
MCV RBC AUTO: 94.7 FL (ref 81–99)
MONOCYTES # BLD AUTO: 0.8 10*3/UL (ref 0.2–0.8)
MONOCYTES NFR BLD AUTO: 8.8 % (ref 4.4–11.3)
NEUTS SEG NFR BLD AUTO: 62.1 % (ref 38.7–80)
PLATELET # BLD AUTO: 255 X10E3/UL (ref 140–360)
POTASSIUM SERPL-SCNC: 4.9 MMOL/L (ref 3.5–5.1)
RBC # BLD AUTO: 4.72 X10E6/UL (ref 4.3–5.7)
SODIUM SERPL-SCNC: 137 MMOL/L (ref 136–145)